# Patient Record
Sex: FEMALE | Race: WHITE | NOT HISPANIC OR LATINO | Employment: FULL TIME | URBAN - METROPOLITAN AREA
[De-identification: names, ages, dates, MRNs, and addresses within clinical notes are randomized per-mention and may not be internally consistent; named-entity substitution may affect disease eponyms.]

---

## 2019-10-07 PROBLEM — E22.1 HYPERPROLACTINEMIA: Status: ACTIVE | Noted: 2019-01-01

## 2020-03-16 ENCOUNTER — TELEPHONE (OUTPATIENT)
Dept: FAMILY MEDICINE CLINIC | Facility: CLINIC | Age: 30
End: 2020-03-16

## 2020-03-16 NOTE — TELEPHONE ENCOUNTER
PATIENT CALLED AND STATED HE WAS TESTED ON 3/13/2020 COVID 19 AND WANTED TO KNOW WHEN THE RESULTS WILL BE IN.PLEASE CALL AND ADVISE [phone]

## 2020-10-22 ENCOUNTER — TELEPHONE (OUTPATIENT)
Dept: FAMILY MEDICINE CLINIC | Facility: CLINIC | Age: 30
End: 2020-10-22

## 2020-10-22 NOTE — TELEPHONE ENCOUNTER
Radha Bright, with Dr. Haines's office is calling to state Dr. Haines reviewed the records and recommends the patient see an Endocrinologist rather than Dr. Haines  She states they are canceling the appointment with Dr. Haines.    Please advise.    Radha Bright can be reached at [phone]

## 2022-11-23 PROBLEM — R68.82 REDUCED LIBIDO: Status: ACTIVE | Noted: 2018-12-24

## 2022-11-23 PROBLEM — F32.A DEPRESSIVE DISORDER: Status: ACTIVE | Noted: 2020-11-19

## 2023-04-21 ENCOUNTER — TELEPHONE (OUTPATIENT)
Dept: FAMILY MEDICINE CLINIC | Facility: CLINIC | Age: 33
End: 2023-04-21

## 2023-04-21 RX ORDER — EMTRICITABINE AND TENOFOVIR DISOPROXIL FUMARATE 200; 300 MG/1; MG/1
1 TABLET, FILM COATED ORAL DAILY
Qty: 90 TABLET | Refills: 0 | Status: CANCELLED | OUTPATIENT
Start: 2023-04-21

## 2023-04-21 NOTE — TELEPHONE ENCOUNTER
Caller:     Relationship:     Best call back number: [phone]    Requested Prescriptions:   Requested Prescriptions     Pending Prescriptions Disp Refills   • emtricitabine-tenofovir (Truvada) 200-300 MG per tablet 90 tablet 1     Sig: Take 1 tablet by mouth Daily.        Pharmacy where request should be sent: [id number] [address] AT [facility] - [phone] [facility] [phone]      Last office visit with prescribing clinician: 3/14/2023   Last telemedicine visit with prescribing clinician: 6/13/2023   Next office visit with prescribing clinician: 6/13/2023     Additional details provided by patient: PATIENT IS OUT OF MEDICATION  Does the patient have less than a 3 day supply:  [x] Yes  [] No    Would you like a call back once the refill request has been completed: [x] Yes [] No    If the office needs to give you a call back, can they leave a voicemail: [x] Yes [] No    Francis Claros Rep   04/21/23 15:53 EDT       .”

## 2023-08-15 DIAGNOSIS — Z11.3 ROUTINE SCREENING FOR STI (SEXUALLY TRANSMITTED INFECTION): Primary | ICD-10-CM

## 2023-08-15 DIAGNOSIS — K62.89 RECTAL IRRITATION: ICD-10-CM

## 2023-08-22 DIAGNOSIS — A74.9 CHLAMYDIA: Primary | ICD-10-CM

## 2023-08-22 RX ORDER — AZITHROMYCIN 250 MG/1
1000 TABLET, FILM COATED ORAL ONCE
Qty: 4 TABLET | Refills: 0 | Status: SHIPPED | OUTPATIENT
Start: 2023-08-22 | End: 2023-08-22

## 2023-08-22 RX ORDER — DOXYCYCLINE HYCLATE 100 MG/1
100 CAPSULE ORAL 2 TIMES DAILY
Qty: 14 CAPSULE | Refills: 0 | Status: SHIPPED | OUTPATIENT
Start: 2023-08-22

## 2023-09-20 ENCOUNTER — OFFICE VISIT (OUTPATIENT)
Dept: FAMILY MEDICINE CLINIC | Facility: CLINIC | Age: 33
End: 2023-09-20
Payer: COMMERCIAL

## 2023-09-20 VITALS
OXYGEN SATURATION: 100 % | HEART RATE: 100 BPM | DIASTOLIC BLOOD PRESSURE: 74 MMHG | RESPIRATION RATE: 18 BRPM | BODY MASS INDEX: 23.43 KG/M2 | WEIGHT: 173 LBS | HEIGHT: 72 IN | SYSTOLIC BLOOD PRESSURE: 122 MMHG

## 2023-09-20 DIAGNOSIS — Z51.81 THERAPEUTIC DRUG MONITORING: ICD-10-CM

## 2023-09-20 DIAGNOSIS — A54.9 GONORRHEA: ICD-10-CM

## 2023-09-20 DIAGNOSIS — Z72.51 HIGH RISK SEXUAL BEHAVIOR, UNSPECIFIED TYPE: ICD-10-CM

## 2023-09-20 DIAGNOSIS — E34.9 HORMONE IMBALANCE: ICD-10-CM

## 2023-09-20 DIAGNOSIS — F64.0 GENDER DYSPHORIA IN ADULT: Primary | ICD-10-CM

## 2023-09-20 RX ORDER — PROGESTERONE 100 MG/1
100 CAPSULE ORAL DAILY
Qty: 90 CAPSULE | Refills: 1 | Status: SHIPPED | OUTPATIENT
Start: 2023-09-20 | End: 2024-03-18

## 2023-09-20 RX ORDER — DOXYCYCLINE HYCLATE 100 MG/1
100 CAPSULE ORAL 2 TIMES DAILY
Qty: 30 CAPSULE | Refills: 0 | Status: SHIPPED | OUTPATIENT
Start: 2023-09-20

## 2023-09-20 NOTE — PATIENT INSTRUCTIONS
For pre-exposure prophylaxis (PrEP), take 1 daily while needed. This is recommended for those engaged in frequent high risk activities and frequent exposure to gonorrhea, chlamydia, and/or syphilis.    For post-exposure prophylaxis (PEP), take 2 immediately (as soon as possible) after an episode of unprotected sex.    Both methods have been shown to decrease likelihood of infection with gonorrhea, chlamydia, and syphilis. Remember that this is not intended as a replacement for safer sex practices, but does take into account real-world practicalities.

## 2023-09-20 NOTE — PROGRESS NOTES
"Chief Complaint  Gender Dyphoria in Adult    Subjective    History of Present Illness {CC  Problem List  Visit  Diagnosis   Encounters  Notes  Medications  Labs  Result Review Imaging  Media :23}     Shankar Keene presents to [facility] for Gender Dyphoria in Adult.  History of Present Illness     Here today for follow-up as above. Doing well overall. Continues on estradiol as prescribed. Happy with current state of her transition, no unwanted changes. Due for labs today. No problems with self-administration, no injection site reactions.    Successfully treated for rectal gonorrhea. Would like test of cure today. Also interested in Doxy prep. Continues on Truvada for HIV preexposure prophylaxis as previously prescribed.    Curious about starting progesterone, hoping that it might help with libido. Does feel that she is still getting sufficient breast growth so does not want to stop that. Wondering if low-dose as needed progesterone might be sufficient for libido.    Objective     Vital Signs:   /74   Pulse 100   Resp 18   Ht 185.4 cm (73\")   Wt 78.5 kg (173 lb)   SpO2 100%   BMI 22.82 kg/m²   Physical Exam  Vitals and nursing note reviewed.   Constitutional:       General: She is not in acute distress.     Appearance: Normal appearance. She is not ill-appearing.   Cardiovascular:      Rate and Rhythm: Normal rate and regular rhythm.      Pulses: Normal pulses.      Heart sounds: Normal heart sounds. No murmur heard.  Pulmonary:      Effort: Pulmonary effort is normal. No respiratory distress.      Breath sounds: Normal breath sounds. No rales.   Neurological:      Mental Status: She is alert and oriented to person, place, and time. Mental status is at baseline.   Psychiatric:         Mood and Affect: Mood normal.         Behavior: Behavior normal.        Result Review  Data Reviewed:{ Labs  Result Review  Imaging  Med Tab  Media :23}                 "   Assessment and Plan {CC Problem List  Visit Diagnosis  ROS  Review (Popup)  Health Maintenance  Quality  BestPractice  Medications  SmartSets  SnapShot Encounters  Media :23}   Diagnoses and all orders for this visit:    1. Gender dysphoria in adult (Primary)  -     Progesterone (PROMETRIUM) 100 MG capsule; Take 1 capsule by mouth Daily for 180 days.  Dispense: 90 capsule; Refill: 1  -     Estradiol; Future  -     Estrone; Future  -     Testosterone; Future  -     Lipid Panel With LDL / HDL Ratio; Future  -     Comprehensive Metabolic Panel; Future    2. Hormone imbalance  -     Progesterone (PROMETRIUM) 100 MG capsule; Take 1 capsule by mouth Daily for 180 days.  Dispense: 90 capsule; Refill: 1  -     Estradiol; Future  -     Estrone; Future  -     Testosterone; Future  -     Lipid Panel With LDL / HDL Ratio; Future  -     Comprehensive Metabolic Panel; Future    3. Gonorrhea  -     CT / NG PCR, Rectal - Swab, Large Intestine, Rectum    4. High risk sexual behavior, unspecified type  -     doxycycline (VIBRAMYCIN) 100 MG capsule; Take 1 capsule by mouth 2 (Two) Times a Day.  Dispense: 30 capsule; Refill: 0    5. Therapeutic drug monitoring  -     Estradiol; Future  -     Estrone; Future  -     Testosterone; Future  -     Lipid Panel With LDL / HDL Ratio; Future  -     Comprehensive Metabolic Panel; Future    Orders as above. I will contact her with results as available. Discussed Doxy prep at length, resources provided in her after visit summary today.    Continue regimen as prescribed. Will let me know as refills are needed. Recommended follow-up as below. Encouraged communication via [employer] meantime.    Patient was given instructions and counseling regarding her condition or for health maintenance advice. Please see specific information pulled into the AVS (placed there by myself) if appropriate.    Return in about 3 months (around 12/20/2023), or if symptoms worsen or fail to improve, for f/u  gender-affirming hormone therapy.      SUSAN Bryant MD

## 2023-09-22 LAB
C TRACH DNA ANORECTL QL NAA+PROBE: NEGATIVE
N GONORRHOEA RRNA ANORECTL QL NAA+PROBE: NEGATIVE

## 2023-09-28 RX ORDER — TRETINOIN 0.05 G/100G
1 GEL TOPICAL NIGHTLY
Qty: 45 G | Refills: 1 | Status: SHIPPED | OUTPATIENT
Start: 2023-09-28

## 2023-10-11 DIAGNOSIS — N52.8 OTHER MALE ERECTILE DYSFUNCTION: Primary | ICD-10-CM

## 2023-10-11 RX ORDER — SILDENAFIL 25 MG/1
25 TABLET, FILM COATED ORAL DAILY PRN
Qty: 90 TABLET | Refills: 0 | Status: SHIPPED | OUTPATIENT
Start: 2023-10-11

## 2023-10-25 DIAGNOSIS — E34.9 HORMONE IMBALANCE: ICD-10-CM

## 2023-10-25 DIAGNOSIS — Z72.51 HIGH RISK SEXUAL BEHAVIOR, UNSPECIFIED TYPE: Primary | ICD-10-CM

## 2023-10-25 DIAGNOSIS — F64.0 GENDER DYSPHORIA IN ADULT: ICD-10-CM

## 2023-10-25 RX ORDER — ESTRADIOL VALERATE 20 MG/ML
INJECTION INTRAMUSCULAR
Qty: 5 ML | Refills: 3 | Status: SHIPPED | OUTPATIENT
Start: 2023-10-25

## 2023-10-25 RX ORDER — PROGESTERONE 100 MG/1
100 CAPSULE ORAL DAILY
Qty: 90 CAPSULE | Refills: 1 | Status: SHIPPED | OUTPATIENT
Start: 2023-10-25 | End: 2024-04-22

## 2023-10-25 RX ORDER — SYRINGE, DISPOSABLE, 1 ML
1 SYRINGE, EMPTY DISPOSABLE MISCELLANEOUS 2 TIMES WEEKLY
Qty: 100 EACH | Refills: 1 | Status: SHIPPED | OUTPATIENT
Start: 2023-10-26

## 2023-10-25 RX ORDER — LANCETS 30 GAUGE
1 EACH MISCELLANEOUS 2 TIMES WEEKLY
Qty: 100 EACH | Refills: 1 | Status: SHIPPED | OUTPATIENT
Start: 2023-10-26

## 2023-10-25 RX ORDER — EMTRICITABINE AND TENOFOVIR DISOPROXIL FUMARATE 200; 300 MG/1; MG/1
1 TABLET, FILM COATED ORAL DAILY
Qty: 90 TABLET | Refills: 1 | Status: SHIPPED | OUTPATIENT
Start: 2023-10-25

## 2023-11-09 RX ORDER — TRETINOIN 1 MG/G
GEL TOPICAL NIGHTLY
Qty: 60 G | Refills: 1 | Status: SHIPPED | OUTPATIENT
Start: 2023-11-09 | End: 2023-11-12

## 2023-11-10 DIAGNOSIS — Z72.51 HIGH RISK SEXUAL BEHAVIOR, UNSPECIFIED TYPE: ICD-10-CM

## 2023-11-10 RX ORDER — DOXYCYCLINE HYCLATE 100 MG/1
100 CAPSULE ORAL 2 TIMES DAILY
Qty: 30 CAPSULE | Refills: 0 | Status: SHIPPED | OUTPATIENT
Start: 2023-11-10

## 2023-11-12 RX ORDER — TRETINOIN 0.4 MG/G
GEL TOPICAL NIGHTLY
Qty: 50 G | Refills: 1 | Status: SHIPPED | OUTPATIENT
Start: 2023-11-12

## 2023-11-21 DIAGNOSIS — K62.89 RECTAL IRRITATION: Primary | ICD-10-CM

## 2023-11-28 ENCOUNTER — OFFICE VISIT (OUTPATIENT)
Dept: FAMILY MEDICINE CLINIC | Facility: CLINIC | Age: 33
End: 2023-11-28
Payer: COMMERCIAL

## 2023-11-28 VITALS
DIASTOLIC BLOOD PRESSURE: 82 MMHG | OXYGEN SATURATION: 98 % | HEART RATE: 91 BPM | WEIGHT: 180 LBS | HEIGHT: 72 IN | RESPIRATION RATE: 18 BRPM | SYSTOLIC BLOOD PRESSURE: 118 MMHG | BODY MASS INDEX: 24.38 KG/M2

## 2023-11-28 DIAGNOSIS — N48.89: ICD-10-CM

## 2023-11-28 DIAGNOSIS — R19.8 ANAL DISCHARGE: Primary | ICD-10-CM

## 2023-11-28 RX ORDER — TESTOSTERONE 16.2 MG/G
2.5 GEL TRANSDERMAL DAILY
Qty: 75 EACH | Refills: 5 | Status: SHIPPED | OUTPATIENT
Start: 2023-11-28 | End: 2023-11-29

## 2023-11-28 NOTE — PROGRESS NOTES
"Chief Complaint  Penis Pain (X 8 weeks )    Subjective    History of Present Illness {CC  Problem List  Visit  Diagnosis   Encounters  Notes  Medications  Labs  Result Review Imaging  Media :23}     Shankar Keene presents to [facility] for Penis Pain (X 8 weeks ).  History of Present Illness     Here today with concern as above. Has had irritation around the head of her penis for weeks now. Has been treating with antibiotic ointment and Vaseline without much improvement. Has been very concerned about the possibility of a sexually transmitted infection in the past however she has been tested and treated several times. Does have some ongoing odor in the anal region, is worried that she has contracted gonorrhea again. Did have negative testing recently though symptoms continue. Is monogamous with 1 male partner at this time.    Has done some reading, is wondering about the possibility of using very small amount of topical testosterone on the penis to avoid further atrophy with the gender affirming hormone therapy that she has been using. Also interested in seeing urology for further evaluation and management.    Objective     Vital Signs:   /82   Pulse 91   Resp 18   Ht 185.4 cm (73\")   Wt 81.6 kg (180 lb)   SpO2 98%   BMI 23.75 kg/m²   Physical Exam  Vitals and nursing note reviewed.   Constitutional:       General: She is not in acute distress.     Appearance: Normal appearance. She is not ill-appearing.   Cardiovascular:      Rate and Rhythm: Normal rate and regular rhythm.      Pulses: Normal pulses.      Heart sounds: Normal heart sounds. No murmur heard.  Pulmonary:      Effort: Pulmonary effort is normal. No respiratory distress.      Breath sounds: Normal breath sounds. No rales.   Genitourinary:     Comments: Irritation and maceration around the dorsal coronal ridge. No erythema or swelling suggestive of active infection.  Neurological:      Mental " Status: She is alert and oriented to person, place, and time. Mental status is at baseline.   Psychiatric:         Mood and Affect: Mood normal.         Behavior: Behavior normal.          Result Review  Data Reviewed:{ Labs  Result Review  Imaging  Med Tab  Media :23}                   Assessment and Plan {CC Problem List  Visit Diagnosis  ROS  Review (Popup)  Health Maintenance  Quality  BestPractice  Medications  SmartSets  SnapShot Encounters  Media :23}   Diagnoses and all orders for this visit:    1. Anal discharge (Primary)  -     Chlamydia trachomatis, Neisseria gonorrhoeae, Trichomonas vaginalis, PCR - Swab, Urethra  -     Cancel: CT / NG PCR, Rectal - Swab, Large Intestine, Rectum; Future  -     CT / NG PCR, Rectal - Swab, Large Intestine, Rectum    2. Penile atrophy  -     Testosterone (AndroGel Pump) 20.25 MG/ACT (1.62%) gel; Place 2.5 g on the skin as directed by provider Daily.  Dispense: 75 each; Refill: 5  -     Ambulatory Referral to Urology    Will go ahead and test as above for ongoing anal irritation and discharge. Suspect possible trichomonas. We can treat presumptively if needed. She will keep me updated with any change in symptoms.    Will try low-dose testosterone as above and will refer to urology for further evaluation and management.    Recommended follow up as below. Encouraged communication via [employer] in the meantime.     Patient was given instructions and counseling regarding her condition or for health maintenance advice. Please see specific information pulled into the AVS (placed there by myself) if appropriate.    Return in about 2 months (around 1/28/2024), or if symptoms worsen or fail to improve.    SUSAN Bryant MD

## 2023-11-29 DIAGNOSIS — N48.89: Primary | ICD-10-CM

## 2023-11-29 LAB
C TRACH RRNA SPEC QL NAA+PROBE: NEGATIVE
N GONORRHOEA RRNA SPEC QL NAA+PROBE: NEGATIVE
T VAGINALIS RRNA SPEC QL NAA+PROBE: NEGATIVE

## 2023-11-29 RX ORDER — TESTOSTERONE 12.5 MG/1.25G
25 GEL TOPICAL DAILY
Qty: 10 EACH | Refills: 5 | Status: SHIPPED | OUTPATIENT
Start: 2023-11-29

## 2023-11-29 RX ORDER — METRONIDAZOLE 500 MG/1
500 TABLET ORAL 2 TIMES DAILY
Qty: 28 TABLET | Refills: 0 | Status: SHIPPED | OUTPATIENT
Start: 2023-11-29 | End: 2023-12-01 | Stop reason: SDUPTHER

## 2023-12-01 ENCOUNTER — TELEPHONE (OUTPATIENT)
Dept: FAMILY MEDICINE CLINIC | Facility: CLINIC | Age: 33
End: 2023-12-01

## 2023-12-01 RX ORDER — METRONIDAZOLE 500 MG/1
500 TABLET ORAL 2 TIMES DAILY
Qty: 28 TABLET | Refills: 0 | Status: SHIPPED | OUTPATIENT
Start: 2023-12-01 | End: 2023-12-15

## 2023-12-01 NOTE — TELEPHONE ENCOUNTER
"    Caller: Shankar Keene \"Armand\"    Relationship to patient: Self    Best call back number: [phone]    Patient is needing:     PATIENT ACCIDENTALLY THREW AWAY HALF OF THE PRESCRIPTION THAT WAS SENT IN. AND IS NEEDING A NEW PRESCRIPTION FOR 7 DAYS.         metroNIDAZOLE (Flagyl) 500 MG tablet         PREFERRED PHARMACY:     [id number] [place], [place] - [address] AT [facility] [address] [facility] [phone] [facility] [phone]  [phone]   "

## 2023-12-05 DIAGNOSIS — J30.2 SEASONAL ALLERGIES: ICD-10-CM

## 2023-12-06 RX ORDER — ALBUTEROL SULFATE 90 UG/1
2 AEROSOL, METERED RESPIRATORY (INHALATION) EVERY 4 HOURS PRN
Qty: 8 G | Refills: 6 | Status: SHIPPED | OUTPATIENT
Start: 2023-12-06

## 2023-12-15 DIAGNOSIS — N52.8 OTHER MALE ERECTILE DYSFUNCTION: ICD-10-CM

## 2023-12-15 RX ORDER — SILDENAFIL CITRATE 20 MG/1
20 TABLET ORAL DAILY PRN
Qty: 90 TABLET | Refills: 2 | Status: SHIPPED | OUTPATIENT
Start: 2023-12-15

## 2023-12-18 NOTE — PROGRESS NOTES
"Consult (Gender dysphoria; facial feminization)            History of Present Illness  Shankar Keene is a 33 y.o. adult who presents to [facility] as a transgender female, a consult from Preston Bryant MD to discuss facial feminization surgery.  She has identified herself as female  and started social transitioning 1+ years ago.   She has been medically transitioning on for about 1+_year. She does not have a mental health provider letter on file from a from a mental health provider that supports the need for facial feminization surgery. She is otherwise reasonably healthy and does not smoke. She has not changed her social name.  She would like to get my opinion about the feminine features on her face and what could be change. Her main complaint is her nose and prominent  Anupam's apple.    Has had voice therapy.     ROS:   All review of system is negative except the ones above.     Subjective       Red dye, Penicillins, and Sulfa antibiotics  Allergies Reconciled.  Review of Systems  All system were reviewed and were negative, except the ones noted above.   Objective     /87 (BP Location: Left arm, Patient Position: Sitting, Cuff Size: Adult)   Pulse 102   Temp 98.6 °F (37 °C) (Temporal)   Ht 185.4 cm (72.99\")   Wt 84.7 kg (186 lb 12.8 oz)   SpO2 98%   BMI 24.65 kg/m²     Body mass index is 24.65 kg/m².    Physical Exam   Cardiovascular: Normal rate.     Pulmonary/Chest  Effort normal.       Face: patient has a normocephalic head with a long forehead with 7 cm between the eyebrows to the start of the hairline. There is male badness pattern. There are several forehead bossing with rounded prominences of the bone from the mid forehead outward to the outside of the bony eye sockets. Brows are located at the level of the upper orbital rim   Temporal fossa with adequate fill  Cheek: good soft tissue fill   Nose is straight, no septal deviation, nasal " radix with an acute angle of 100, nose is long with deformity at the keystone area.   Jaw  is very feminine, mainly in the angles   but the  para symphyseal angle is larger  Neck: supple with no trachea deviation, obvious Anupam's apple.   Result Review :       Procedures         Assessment and Plan      Diagnoses and all orders for this visit:    1. Gender dysphoria (Primary)  -     CT Maxillofacial Without Contrast; Future    2. Gender dysphoria in adult        Plan: Facial Feminization with frontal sinus set back, orbital rim remodeling, rhinoplasty, and tracheal shaving 6.5hrs - she will think about the genioplasty. I will add that for the consent and OR request and we can de escalate if she is not comfortable with that procedure at this time.   The patient  would be a reasonable candidate for facial feminization  surgery in that she has been transitioning for some time and has a masculinized forehead, nose and neck. Changing her masculine features to reflect the smaller angles and proportions of a female face will generate many positive social and psychological  benefits. She was told about the nature of the procedure and the fact that it would involve an incision in the of the hairline, shaving of the bone with possible frontal sinus fracture and repositioning to achieve the convex forehead shape.  The jaw would be addressed through an intra oral incision to address the symphysis and para symphysis and a submental incision for the correction of the Anupam's apple.  She was told about potential complications including bleeding, infection, anesthetic risks, wound healing difficulties, contour irregularities, scalp numbness  and asymmetries.  She was interested in proceeding with surgery. She was told to we would require a letter from a mental health professionals supporting her decision to proceed with this surgery. I will attempt to get insurance coverage for such a procedure for her. Photos were obtained.  I spent  60 minutes with patient explaining about the specific clinical condition and care.   ICD:  F64.0 gender dysphoria   CPT:    09545 reduction forehead, contouring and setback of anterior frontal sinus wall  44789 adjacent tissue transfer for scalp advancement  47293 genioplasty with osteotomies, 2 or more osteotomies  47436 chin and mandibular  bone reduction via high speed burring and soft tissue reshaping  39202 rhinoplasty, primary, complete, external parts, including bony pyramid, lateral and alar cartilages, and/por elevation of the nasal tip   26426-45  turbinectomy         74143 tracheoplasty, cervical   0559T anatomic model 3D   0561T anatomic guide from ct     98343- Smoking and tobacco use cessation counseling visit; intensive, greater than 10 minutes    Scribed by Alanna Hope, acting as a scribe for Hugh Shanks MD, 12/28/23 13:32 EST.  Hugh Shanks MD's signature on the note affirms that the note adequately documents the care provided.               Follow Up     Return RTC in 2 months.    Patient was given instructions and counseling regarding her condition. Please see specific information pulled into the AVS if appropriate.     Hugh Shanks MD  12/28/2023

## 2023-12-21 ENCOUNTER — OFFICE VISIT (OUTPATIENT)
Dept: FAMILY MEDICINE CLINIC | Facility: CLINIC | Age: 33
End: 2023-12-21
Payer: COMMERCIAL

## 2023-12-21 VITALS
DIASTOLIC BLOOD PRESSURE: 80 MMHG | SYSTOLIC BLOOD PRESSURE: 126 MMHG | RESPIRATION RATE: 18 BRPM | HEIGHT: 72 IN | HEART RATE: 115 BPM | OXYGEN SATURATION: 99 % | WEIGHT: 185 LBS | BODY MASS INDEX: 25.06 KG/M2

## 2023-12-21 DIAGNOSIS — F64.0 GENDER DYSPHORIA IN ADULT: Primary | ICD-10-CM

## 2023-12-21 DIAGNOSIS — Z51.81 THERAPEUTIC DRUG MONITORING: ICD-10-CM

## 2023-12-21 DIAGNOSIS — E34.9 HORMONE IMBALANCE: ICD-10-CM

## 2023-12-21 NOTE — PROGRESS NOTES
"Chief Complaint  Gender Dysphoria in Adult (3 month f/u)    Subjective    History of Present Illness {CC  Problem List  Visit  Diagnosis   Encounters  Notes  Medications  Labs  Result Review Imaging  Media :23}     Shankar Keene presents to [facility] for Gender Dysphoria in Adult (3 month f/u).  History of Present Illness     Here today for follow-up as above. Due for check of hormone labs today. Continues with regimen as prescribed. No problems with self administration, no injection site reactions. Very happy with the current state of her transition. Has an appointment with Dr. Shanks upcoming to discuss possible facial feminization.    Has been using some topical testosterone to help with some penile skin atrophy and low libido. Is happy to report that results have been better than expected. Is interested in knowing how much systemic absorption she is getting, would like to check another testosterone level immediately after dosing in the future.    Objective     Vital Signs:   /80   Pulse 115   Resp 18   Ht 185.4 cm (73\")   Wt 83.9 kg (185 lb)   SpO2 99%   BMI 24.41 kg/m²   Physical Exam  Vitals and nursing note reviewed.   Constitutional:       General: She is not in acute distress.     Appearance: Normal appearance. She is not ill-appearing.   Cardiovascular:      Rate and Rhythm: Normal rate and regular rhythm.      Pulses: Normal pulses.      Heart sounds: Normal heart sounds. No murmur heard.  Pulmonary:      Effort: Pulmonary effort is normal. No respiratory distress.      Breath sounds: Normal breath sounds. No rales.   Neurological:      Mental Status: She is alert and oriented to person, place, and time. Mental status is at baseline.   Psychiatric:         Mood and Affect: Mood normal.         Behavior: Behavior normal.          Result Review  Data Reviewed:{ Labs  Result Review  Imaging  Med Tab  Media :23}                   Assessment and " Plan {CC Problem List  Visit Diagnosis  ROS  Review (Popup)  Health Maintenance  Quality  BestPractice  Medications  SmartSets  SnapShot Encounters  Media :23}   Diagnoses and all orders for this visit:    1. Gender dysphoria in adult (Primary)  -     Estradiol  -     Estrone  -     Testosterone  -     Testosterone; Future    2. Hormone imbalance  -     Estradiol  -     Estrone  -     Testosterone  -     Testosterone; Future    3. Therapeutic drug monitoring  -     Estradiol  -     Estrone  -     Testosterone  -     Testosterone; Future    Orders as above. I will contact her with results as available. Also ordered a repeat testosterone to be done in the future after low-dose testosterone administration.    Recommended follow up as below. Encouraged communication via [employer] in the meantime.     Patient was given instructions and counseling regarding her condition or for health maintenance advice. Please see specific information pulled into the AVS (placed there by myself) if appropriate.    Return in about 3 months (around 3/21/2024), or if symptoms worsen or fail to improve, for f/u gender-affirming hormone therapy.    SUSAN Bryant MD

## 2023-12-22 LAB
ESTRADIOL SERPL-MCNC: 161 PG/ML
ESTRONE SERPL-MCNC: 100 PG/ML (ref 27–231)
TESTOST SERPL-MCNC: 14 NG/DL (ref 8–60)

## 2023-12-28 ENCOUNTER — OFFICE VISIT (OUTPATIENT)
Dept: PLASTIC SURGERY | Facility: CLINIC | Age: 33
End: 2023-12-28
Payer: COMMERCIAL

## 2023-12-28 VITALS
OXYGEN SATURATION: 98 % | TEMPERATURE: 98.6 F | WEIGHT: 186.8 LBS | HEIGHT: 72 IN | HEART RATE: 102 BPM | SYSTOLIC BLOOD PRESSURE: 126 MMHG | BODY MASS INDEX: 25.3 KG/M2 | DIASTOLIC BLOOD PRESSURE: 87 MMHG

## 2023-12-28 DIAGNOSIS — F64.9 GENDER DYSPHORIA: Primary | ICD-10-CM

## 2023-12-28 DIAGNOSIS — F64.0 GENDER DYSPHORIA IN ADULT: ICD-10-CM

## 2023-12-28 PROCEDURE — 99205 OFFICE O/P NEW HI 60 MIN: CPT | Performed by: SURGERY

## 2023-12-28 RX ORDER — DEXTROAMPHETAMINE SACCHARATE, AMPHETAMINE ASPARTATE, DEXTROAMPHETAMINE SULFATE AND AMPHETAMINE SULFATE 3.75; 3.75; 3.75; 3.75 MG/1; MG/1; MG/1; MG/1
TABLET ORAL
COMMUNITY
Start: 2023-12-21

## 2024-01-02 ENCOUNTER — PATIENT MESSAGE (OUTPATIENT)
Dept: PLASTIC SURGERY | Facility: CLINIC | Age: 34
End: 2024-01-02
Payer: COMMERCIAL

## 2024-01-02 ENCOUNTER — PREP FOR SURGERY (OUTPATIENT)
Dept: OTHER | Facility: HOSPITAL | Age: 34
End: 2024-01-02
Payer: COMMERCIAL

## 2024-01-02 ENCOUNTER — PATIENT ROUNDING (BHMG ONLY) (OUTPATIENT)
Dept: PLASTIC SURGERY | Facility: CLINIC | Age: 34
End: 2024-01-02
Payer: COMMERCIAL

## 2024-01-02 DIAGNOSIS — F64.0 GENDER DYSPHORIA IN ADULT: Primary | ICD-10-CM

## 2024-01-02 PROBLEM — C50.919 BREAST CANCER: Status: ACTIVE | Noted: 2024-01-02

## 2024-01-02 RX ORDER — SCOLOPAMINE TRANSDERMAL SYSTEM 1 MG/1
1 PATCH, EXTENDED RELEASE TRANSDERMAL CONTINUOUS
OUTPATIENT
Start: 2024-01-02 | End: 2024-01-05

## 2024-01-02 RX ORDER — CEFAZOLIN SODIUM IN 0.9 % NACL 3 G/100 ML
3000 INTRAVENOUS SOLUTION, PIGGYBACK (ML) INTRAVENOUS ONCE
OUTPATIENT
Start: 2024-01-02 | End: 2024-01-02

## 2024-01-11 ENCOUNTER — TELEPHONE (OUTPATIENT)
Dept: PLASTIC SURGERY | Facility: CLINIC | Age: 34
End: 2024-01-11
Payer: COMMERCIAL

## 2024-01-12 ENCOUNTER — TELEPHONE (OUTPATIENT)
Dept: PLASTIC SURGERY | Facility: CLINIC | Age: 34
End: 2024-01-12
Payer: COMMERCIAL

## 2024-01-16 ENCOUNTER — TELEPHONE (OUTPATIENT)
Dept: PLASTIC SURGERY | Facility: CLINIC | Age: 34
End: 2024-01-16
Payer: COMMERCIAL

## 2024-01-22 RX ORDER — POLYMYXIN B SULFATE AND TRIMETHOPRIM 1; 10000 MG/ML; [USP'U]/ML
1 SOLUTION OPHTHALMIC EVERY 4 HOURS
Qty: 10 ML | Refills: 0 | Status: SHIPPED | OUTPATIENT
Start: 2024-01-22 | End: 2024-01-22 | Stop reason: SDUPTHER

## 2024-01-22 RX ORDER — POLYMYXIN B SULFATE AND TRIMETHOPRIM 1; 10000 MG/ML; [USP'U]/ML
1 SOLUTION OPHTHALMIC EVERY 4 HOURS
Qty: 10 ML | Refills: 0 | Status: SHIPPED | OUTPATIENT
Start: 2024-01-22

## 2024-01-24 DIAGNOSIS — Z72.51 HIGH RISK SEXUAL BEHAVIOR, UNSPECIFIED TYPE: ICD-10-CM

## 2024-01-24 RX ORDER — DOXYCYCLINE HYCLATE 100 MG/1
100 CAPSULE ORAL 2 TIMES DAILY
Qty: 30 CAPSULE | Refills: 0 | Status: SHIPPED | OUTPATIENT
Start: 2024-01-24

## 2024-02-06 ENCOUNTER — OFFICE VISIT (OUTPATIENT)
Dept: FAMILY MEDICINE CLINIC | Facility: CLINIC | Age: 34
End: 2024-02-06
Payer: COMMERCIAL

## 2024-02-06 VITALS
BODY MASS INDEX: 24.52 KG/M2 | SYSTOLIC BLOOD PRESSURE: 128 MMHG | DIASTOLIC BLOOD PRESSURE: 88 MMHG | RESPIRATION RATE: 18 BRPM | WEIGHT: 181 LBS | HEART RATE: 103 BPM | HEIGHT: 72 IN | OXYGEN SATURATION: 100 %

## 2024-02-06 DIAGNOSIS — H69.93 EUSTACHIAN TUBE DYSFUNCTION, BILATERAL: ICD-10-CM

## 2024-02-06 DIAGNOSIS — F64.0 GENDER DYSPHORIA IN ADULT: ICD-10-CM

## 2024-02-06 DIAGNOSIS — J06.9 VIRAL URI: Primary | ICD-10-CM

## 2024-02-06 DIAGNOSIS — E34.9 HORMONE IMBALANCE: ICD-10-CM

## 2024-02-06 PROCEDURE — 99213 OFFICE O/P EST LOW 20 MIN: CPT | Performed by: FAMILY MEDICINE

## 2024-02-06 RX ORDER — PROGESTERONE 50 MG/ML
10 INJECTION, SOLUTION INTRAMUSCULAR WEEKLY
Qty: 10 ML | Refills: 1 | Status: SHIPPED | OUTPATIENT
Start: 2024-02-06

## 2024-02-06 NOTE — PROGRESS NOTES
"Chief Complaint  Thrush and Sore Throat    Subjective    History of Present Illness    Shankar Keene presents to [facility] for Thrush and Sore Throat.  History of Present Illness     Here today with concern as above. Has had upper respiratory symptoms for better part of a month now. Worried that there might be something going on more so than a simple upper respiratory virus. Has taken home COVID tests that have been negative. No further fevers or chills. Main concerns at this point are ongoing congestion and some slight sore throat. Was previously worried about the possibility of thrush. No further tongue changes noted.    Interested in switching to injectable progesterone, hoping that this might provide more overall benefit. Has found that penile health seems better with increased doses of progesterone. Has been able to avoid topical testosterone without any return of penile atrophy.    Objective     Vital Signs:   /88   Pulse 103   Resp 18   Ht 182.9 cm (72\")   Wt 82.1 kg (181 lb)   SpO2 100%   BMI 24.55 kg/m²   Physical Exam  Vitals and nursing note reviewed.   Constitutional:       General: She is not in acute distress.     Appearance: Normal appearance. She is not ill-appearing.   HENT:      Right Ear: Hearing, ear canal and external ear normal.      Left Ear: Hearing, ear canal and external ear normal.      Ears:      Comments: Slight mucoid effusion bilateral     Nose: Mucosal edema and congestion present.      Mouth/Throat:      Pharynx: Posterior oropharyngeal erythema present.      Comments: Posterior pharyngeal cobblestoning  Cardiovascular:      Rate and Rhythm: Normal rate and regular rhythm.      Pulses: Normal pulses.      Heart sounds: Normal heart sounds. No murmur heard.  Pulmonary:      Effort: Pulmonary effort is normal. No respiratory distress.      Breath sounds: Normal breath sounds. No rales.   Neurological:      Mental Status: She is alert and " oriented to person, place, and time. Mental status is at baseline.   Psychiatric:         Mood and Affect: Mood normal.         Behavior: Behavior normal.                          Assessment and Plan   Diagnoses and all orders for this visit:    1. Viral URI (Primary)    2. Eustachian tube dysfunction, bilateral    3. Gender dysphoria in adult  -     progesterone oil 50 MG/ML injection; Inject 0.2 mL into the appropriate muscle as directed by prescriber 1 (One) Time Per Week.  Dispense: 10 mL; Refill: 1    4. Hormone imbalance  -     progesterone oil 50 MG/ML injection; Inject 0.2 mL into the appropriate muscle as directed by prescriber 1 (One) Time Per Week.  Dispense: 10 mL; Refill: 1    Recommended ongoing supportive care and symptom management for viral URI and eustachian tube dysfunction. Suggested over-the-counter Afrin nasal spray for 3 to 5 days followed by a nasal steroid.    Injectable progesterone as above. Will plan on checking levels in about 3 months.  BMI is within normal parameters. No other follow-up for BMI required.    Recommended follow up as below. Encouraged communication via [employer] in the meantime.     Patient was given instructions and counseling regarding her condition or for health maintenance advice. Please see specific information pulled into the AVS (placed there by myself) if appropriate.    Return in about 3 months (around 5/6/2024), or if symptoms worsen or fail to improve, for f/u gender-affirming hormone therapy.    SUSAN Bryant MD

## 2024-02-16 ENCOUNTER — TELEPHONE (OUTPATIENT)
Dept: PLASTIC SURGERY | Facility: CLINIC | Age: 34
End: 2024-02-16
Payer: COMMERCIAL

## 2024-02-20 RX ORDER — PREDNISONE 20 MG/1
40 TABLET ORAL DAILY
Qty: 10 TABLET | Refills: 0 | Status: SHIPPED | OUTPATIENT
Start: 2024-02-20 | End: 2024-02-25

## 2024-02-26 ENCOUNTER — TELEPHONE (OUTPATIENT)
Dept: PLASTIC SURGERY | Facility: CLINIC | Age: 34
End: 2024-02-26
Payer: COMMERCIAL

## 2024-02-26 NOTE — TELEPHONE ENCOUNTER
Contacted patient regarding pending CT scan with follow up.  Patient states that they have been working out of town and unable to get the CT scan and would like to cancel upcoming appointment at this time.

## 2024-03-15 DIAGNOSIS — E34.9 HORMONE IMBALANCE: ICD-10-CM

## 2024-03-15 DIAGNOSIS — F64.0 GENDER DYSPHORIA IN ADULT: ICD-10-CM

## 2024-03-15 RX ORDER — ESTRADIOL VALERATE 20 MG/ML
INJECTION INTRAMUSCULAR
Qty: 5 ML | Refills: 3 | Status: SHIPPED | OUTPATIENT
Start: 2024-03-15

## 2024-03-21 ENCOUNTER — OFFICE VISIT (OUTPATIENT)
Dept: FAMILY MEDICINE CLINIC | Facility: CLINIC | Age: 34
End: 2024-03-21
Payer: COMMERCIAL

## 2024-03-21 VITALS
RESPIRATION RATE: 18 BRPM | OXYGEN SATURATION: 99 % | SYSTOLIC BLOOD PRESSURE: 122 MMHG | HEIGHT: 72 IN | BODY MASS INDEX: 24.92 KG/M2 | WEIGHT: 184 LBS | HEART RATE: 101 BPM | DIASTOLIC BLOOD PRESSURE: 76 MMHG

## 2024-03-21 DIAGNOSIS — H69.93 EUSTACHIAN TUBE DYSFUNCTION, BILATERAL: ICD-10-CM

## 2024-03-21 DIAGNOSIS — E34.9 HORMONE IMBALANCE: ICD-10-CM

## 2024-03-21 DIAGNOSIS — F64.0 GENDER DYSPHORIA IN ADULT: ICD-10-CM

## 2024-03-21 DIAGNOSIS — Z51.81 THERAPEUTIC DRUG MONITORING: ICD-10-CM

## 2024-03-21 DIAGNOSIS — J02.9 SORE THROAT: Primary | ICD-10-CM

## 2024-03-21 DIAGNOSIS — Z11.3 ROUTINE SCREENING FOR STI (SEXUALLY TRANSMITTED INFECTION): ICD-10-CM

## 2024-03-21 NOTE — PROGRESS NOTES
"Chief Complaint  Gender Dysphoria in Adult (3 month f/u), Sore Throat (\"Swelling sensation\" since January 4th ), and Tinnitus (X3 weeks )    Subjective    History of Present Illness    Shankar Keene presents to [facility] for Gender Dysphoria in Adult (3 month f/u), Sore Throat (\"Swelling sensation\" since January 4th ), and Tinnitus (X3 weeks ).  History of Present Illness     Here today with concerns as above. Has had some ongoing intermittent sore throat, ear fullness, and tinnitus for the past several weeks to months. Has had some intermittent upper respiratory symptoms as well, thinks that she may have gotten several viruses. No other upper respiratory symptoms present at this time. Stress has remained quite high and she has a fair amount of tension in her shoulders and neck. Is worried that she has some ongoing lymphadenopathy under her left jaw.    Due for STI screening, is not sure if the above symptoms could be suggestive of further sexually-transmitted infections. Also has some ongoing rectal fishy smell. Has had some new partners since her last screen.    Due for check of hormone labs today. Is curious about the possibility of having an elevated DHT level with the progesterone. Has switched back to twice weekly progesterone injections and feels that it helps quite a bit with mood and sleep.    Objective     Vital Signs:   /76   Pulse 101   Resp 18   Ht 182.9 cm (72\")   Wt 83.5 kg (184 lb)   SpO2 99%   BMI 24.95 kg/m²   Physical Exam  Vitals and nursing note reviewed.   Constitutional:       General: She is not in acute distress.     Appearance: Normal appearance. She is not ill-appearing.   HENT:      Ears:      Comments: Moderate mucoid effusion bilateral  Cardiovascular:      Rate and Rhythm: Normal rate and regular rhythm.      Pulses: Normal pulses.      Heart sounds: Normal heart sounds. No murmur heard.  Pulmonary:      Effort: Pulmonary effort is normal. " No respiratory distress.      Breath sounds: Normal breath sounds. No rales.   Neurological:      Mental Status: She is alert and oriented to person, place, and time. Mental status is at baseline.   Psychiatric:         Mood and Affect: Mood normal.         Behavior: Behavior normal.                        Assessment and Plan   Diagnoses and all orders for this visit:    1. Sore throat (Primary)  -     Ct / GC DEANNA, Pharyngeal - Swab, Throat  -     CBC & Differential    2. Routine screening for STI (sexually transmitted infection)  -     Ct / GC DEANNA, Pharyngeal - Swab, Throat  -     CT / NG PCR, Rectal - Swab, Large Intestine, Rectum  -     Chlamydia trachomatis, Neisseria gonorrhoeae, PCR - Urine, Urine, Clean Catch  -     RPR  -     HIV-1 / O / 2 Ag / Antibody    3. Gender dysphoria in adult  -     Estradiol  -     Estrone  -     Testosterone  -     Comprehensive Metabolic Panel  -     Progesterone  -     Lipid Panel  -     Dihydrotestosterone    4. Hormone imbalance  -     Estradiol  -     Estrone  -     Testosterone  -     Comprehensive Metabolic Panel  -     Progesterone  -     Lipid Panel  -     Dihydrotestosterone    5. Therapeutic drug monitoring  -     Estradiol  -     Estrone  -     Testosterone  -     Comprehensive Metabolic Panel  -     Progesterone  -     Lipid Panel  -     Dihydrotestosterone    6. Eustachian tube dysfunction, bilateral    Definitely has some eustachian tube dysfunction bilateral, not sure if that contributes to the throat pain. Think it is worth doing STI screening as above given increased risks of late. May also be some generalized allergic rhinitis. Recommended some over-the-counter remedies for that.    Labs as above, I will contact her with results as available.    Recommended follow up as below. Encouraged communication via [employer] in the meantime.     Patient was given instructions and counseling regarding her condition or for health maintenance advice. Please see specific  information pulled into the AVS (placed there by myself) if appropriate.    Return in about 3 months (around 6/21/2024), or if symptoms worsen or fail to improve, for f/u gender-affirming hormone therapy.    SUSAN Bryant MD

## 2024-03-22 LAB
ALBUMIN SERPL-MCNC: 4.6 G/DL (ref 3.9–4.9)
ALBUMIN/GLOB SERPL: 1.8 {RATIO} (ref 1.2–2.2)
ALP SERPL-CCNC: 63 IU/L (ref 44–121)
ALT SERPL-CCNC: 22 IU/L (ref 0–32)
AST SERPL-CCNC: 18 IU/L (ref 0–40)
BASOPHILS # BLD AUTO: 0.1 X10E3/UL (ref 0–0.2)
BASOPHILS NFR BLD AUTO: 1 %
BILIRUB SERPL-MCNC: 0.5 MG/DL (ref 0–1.2)
BUN SERPL-MCNC: 13 MG/DL (ref 6–20)
BUN/CREAT SERPL: 15 (ref 9–23)
CALCIUM SERPL-MCNC: 9.3 MG/DL (ref 8.7–10.2)
CHLORIDE SERPL-SCNC: 100 MMOL/L (ref 96–106)
CHOLEST SERPL-MCNC: 223 MG/DL (ref 100–199)
CO2 SERPL-SCNC: 22 MMOL/L (ref 20–29)
CREAT SERPL-MCNC: 0.88 MG/DL (ref 0.57–1)
EGFRCR SERPLBLD CKD-EPI 2021: 88 ML/MIN/1.73
EOSINOPHIL # BLD AUTO: 0.2 X10E3/UL (ref 0–0.4)
EOSINOPHIL NFR BLD AUTO: 2 %
ERYTHROCYTE [DISTWIDTH] IN BLOOD BY AUTOMATED COUNT: 11.9 % (ref 11.7–15.4)
ESTRADIOL SERPL-MCNC: 175 PG/ML
GLOBULIN SER CALC-MCNC: 2.5 G/DL (ref 1.5–4.5)
GLUCOSE SERPL-MCNC: 97 MG/DL (ref 70–99)
HCT VFR BLD AUTO: 41.2 % (ref 34–46.6)
HDLC SERPL-MCNC: 67 MG/DL
HGB BLD-MCNC: 14 G/DL (ref 11.1–15.9)
HIV 1+2 AB+HIV1 P24 AG SERPL QL IA: NON REACTIVE
IMM GRANULOCYTES # BLD AUTO: 0 X10E3/UL (ref 0–0.1)
IMM GRANULOCYTES NFR BLD AUTO: 0 %
LDLC SERPL CALC-MCNC: 136 MG/DL (ref 0–99)
LYMPHOCYTES # BLD AUTO: 2.2 X10E3/UL (ref 0.7–3.1)
LYMPHOCYTES NFR BLD AUTO: 25 %
MCH RBC QN AUTO: 31.5 PG (ref 26.6–33)
MCHC RBC AUTO-ENTMCNC: 34 G/DL (ref 31.5–35.7)
MCV RBC AUTO: 93 FL (ref 79–97)
MONOCYTES # BLD AUTO: 0.8 X10E3/UL (ref 0.1–0.9)
MONOCYTES NFR BLD AUTO: 10 %
NEUTROPHILS # BLD AUTO: 5.3 X10E3/UL (ref 1.4–7)
NEUTROPHILS NFR BLD AUTO: 62 %
PLATELET # BLD AUTO: 203 X10E3/UL (ref 150–450)
POTASSIUM SERPL-SCNC: 4.2 MMOL/L (ref 3.5–5.2)
PROGEST SERPL-MCNC: 0.2 NG/ML
PROT SERPL-MCNC: 7.1 G/DL (ref 6–8.5)
RBC # BLD AUTO: 4.45 X10E6/UL (ref 3.77–5.28)
RPR SER QL: NON REACTIVE
SODIUM SERPL-SCNC: 137 MMOL/L (ref 134–144)
TESTOST SERPL-MCNC: 13 NG/DL (ref 8–60)
TRIGL SERPL-MCNC: 111 MG/DL (ref 0–149)
VLDLC SERPL CALC-MCNC: 20 MG/DL (ref 5–40)
WBC # BLD AUTO: 8.6 X10E3/UL (ref 3.4–10.8)

## 2024-03-23 LAB
C TRACH RRNA NPH QL NAA+PROBE: NEGATIVE
C TRACH RRNA SPEC QL NAA+PROBE: NEGATIVE
N GONORRHOEA RRNA NPH QL NAA+PROBE: NEGATIVE
N GONORRHOEA RRNA SPEC QL NAA+PROBE: NEGATIVE

## 2024-03-24 LAB
C TRACH DNA ANORECTL QL NAA+PROBE: NEGATIVE
N GONORRHOEA RRNA ANORECTL QL NAA+PROBE: NEGATIVE

## 2024-03-27 LAB — ANDROSTANOLONE SERPL-MCNC: 7.6 NG/DL

## 2024-03-28 LAB — ESTRONE SERPL-MCNC: 82 PG/ML (ref 27–231)

## 2024-05-12 DIAGNOSIS — G44.019 EPISODIC CLUSTER HEADACHE, NOT INTRACTABLE: Primary | ICD-10-CM

## 2024-05-12 DIAGNOSIS — Z72.51 HIGH RISK SEXUAL BEHAVIOR, UNSPECIFIED TYPE: ICD-10-CM

## 2024-05-12 RX ORDER — RIZATRIPTAN BENZOATE 5 MG/1
5 TABLET, ORALLY DISINTEGRATING ORAL ONCE AS NEEDED
Qty: 12 TABLET | Refills: 1 | Status: SHIPPED | OUTPATIENT
Start: 2024-05-12

## 2024-05-13 RX ORDER — EMTRICITABINE AND TENOFOVIR DISOPROXIL FUMARATE 200; 300 MG/1; MG/1
1 TABLET, FILM COATED ORAL DAILY
Qty: 90 TABLET | Refills: 1 | Status: SHIPPED | OUTPATIENT
Start: 2024-05-13

## 2024-05-16 DIAGNOSIS — G44.019 EPISODIC CLUSTER HEADACHE, NOT INTRACTABLE: Primary | ICD-10-CM

## 2024-05-16 RX ORDER — VERAPAMIL HYDROCHLORIDE 80 MG/1
80 TABLET ORAL 3 TIMES DAILY
Qty: 90 TABLET | Refills: 0 | Status: SHIPPED | OUTPATIENT
Start: 2024-05-16

## 2024-05-16 RX ORDER — ZOLMITRIPTAN 5 MG/1
1 SPRAY NASAL AS NEEDED
Qty: 6 EACH | Refills: 1 | Status: SHIPPED | OUTPATIENT
Start: 2024-05-16

## 2024-05-17 DIAGNOSIS — G44.011 INTRACTABLE EPISODIC CLUSTER HEADACHE: Primary | ICD-10-CM

## 2024-05-17 RX ORDER — PREDNISONE 20 MG/1
TABLET ORAL
Qty: 85 TABLET | Refills: 0 | Status: SHIPPED | OUTPATIENT
Start: 2024-05-17 | End: 2024-06-19

## 2024-05-20 ENCOUNTER — OFFICE VISIT (OUTPATIENT)
Dept: FAMILY MEDICINE CLINIC | Facility: CLINIC | Age: 34
End: 2024-05-20
Payer: COMMERCIAL

## 2024-05-20 VITALS
RESPIRATION RATE: 18 BRPM | HEART RATE: 104 BPM | WEIGHT: 186 LBS | HEIGHT: 72 IN | DIASTOLIC BLOOD PRESSURE: 92 MMHG | SYSTOLIC BLOOD PRESSURE: 138 MMHG | OXYGEN SATURATION: 98 % | BODY MASS INDEX: 25.19 KG/M2

## 2024-05-20 DIAGNOSIS — H91.92 HEARING LOSS OF LEFT EAR, UNSPECIFIED HEARING LOSS TYPE: ICD-10-CM

## 2024-05-20 DIAGNOSIS — R51.9 ONE-SIDED HEADACHE: Primary | ICD-10-CM

## 2024-05-20 DIAGNOSIS — H93.12 TINNITUS OF LEFT EAR: ICD-10-CM

## 2024-05-20 PROCEDURE — 99214 OFFICE O/P EST MOD 30 MIN: CPT | Performed by: NURSE PRACTITIONER

## 2024-05-20 NOTE — PROGRESS NOTES
Subjective   Shankar Keene is a 33 y.o. adult.     History of Present Illness   Estab pt Dr Bryant.  Acute visit for headaches    Acute severe one-sided, stabbing, intermittent headache.  First occurred after running had severe headache behind Left eye 16 days ago. This went away eventually and 3 days later would wake her up in the morning/night x several days.  She denies history of migraines.  No nausea or vomiting.  She has been discussing over MyChart concerns with Dr. Bryant.  In past several days headache seems a bit better .  Dr. Bryant has discussed with patient and he feels this could be cluster headache.  Of note, she has seen ENT 4 years ago and had scope and panoramic xray and no chronic sinusistis history.  Has tried treated w rizatriptan without relief.  Just had eye exam last week. Normal eye exam and optho evaluated behind the eye.  3 days ago started verapamil 80mg and has only been taking once a day (although this was ordered 3 times daily). Dr. Bryant had previously written for a large prednisone taper.  She is wanting to avoid oral steroids at this time. she reports severe, sharp, stabbing pain lasts 20 min-1 hr. No light sensitivity, no nausea, no dizziness. She does not report teary eye, no temple tenderness. Eye feels swollen.  She also mentions history of hearing loss and tinnitus in the left ear when she was living in [place] in 2016.  Reports she had previous MRI in [place] that was normal.        The following portions of the patient's history were reviewed and updated as appropriate: allergies, current medications, past family history, past medical history, past social history, past surgical history and problem list.    Review of Systems      Current Outpatient Medications:     albuterol sulfate  (90 Base) MCG/ACT inhaler, Inhale 2 puffs Every 4 (Four) Hours As Needed for Wheezing., Disp: 8 g, Rfl: 6    amphetamine-dextroamphetamine (ADDERALL) 15 MG tablet, , Disp: ,  "Rfl:     busPIRone (BUSPAR) 5 MG tablet, As needed, Disp: , Rfl:     cetirizine (zyrTEC) 10 MG tablet, Take 1 tablet by mouth Daily., Disp: , Rfl:     doxycycline (VIBRAMYCIN) 100 MG capsule, Take 1 capsule by mouth 2 (Two) Times a Day., Disp: 30 capsule, Rfl: 0    emtricitabine-tenofovir (TRUVADA) 200-300 MG per tablet, TAKE 1 TABLET BY MOUTH EVERY DAY, Disp: 90 tablet, Rfl: 1    estradiol valerate (DELESTROGEN) 20 MG/ML injection, Inject 0.1 ml (2 mg) subcutaneously twice weekly., Disp: 5 mL, Rfl: 3    ketoconazole (NIZORAL) 2 % shampoo, LATHER ONTO THE SCALP AND LET SIT FOR 5 MINS BEFORE RINSING. THEN USE TWICE WEEKLY, Disp: , Rfl:     Needle, Disp, (CareTouch Hypodermic Needle) 27G X 1-1/2\" misc, Use 1 Units 2 (Two) Times a Week., Disp: 100 each, Rfl: 1    predniSONE (DELTASONE) 20 MG tablet, Take 5 tablets by mouth Daily for 3 days, THEN 4.5 tablets Daily for 3 days, THEN 4 tablets Daily for 3 days, THEN 3.5 tablets Daily for 3 days, THEN 3 tablets Daily for 3 days, THEN 2.5 tablets Daily for 3 days, THEN 2 tablets Daily for 3 days, THEN 1.5 tablets Daily for 3 days, THEN 1 tablet Daily for 4 days, THEN 0.5 tablets Daily for 5 days., Disp: 85 tablet, Rfl: 0    progesterone oil 50 MG/ML injection, Inject 0.2 mL into the appropriate muscle as directed by prescriber 1 (One) Time Per Week., Disp: 10 mL, Rfl: 1    sildenafil (REVATIO) 20 MG tablet, Take 1 tablet by mouth Daily As Needed (sexual activity). May take up to 3 tabs at a time., Disp: 90 tablet, Rfl: 2    Syringe, Disposable, (CareTouch Luer Lock) 1 ML misc, Use 1 Units 2 (Two) Times a Week., Disp: 100 each, Rfl: 1    Syringe/Needle, Disp, (B-D LUER-FARHANA SYRINGE) 20G X 1\" 1 ML misc, Use 1 Units 2 (Two) Times a Week., Disp: 100 each, Rfl: 1    testosterone (ANDROGEL) 25 MG/2.5GM (1%) gel gel, Place 25 mg on the skin as directed by provider Daily., Disp: 10 each, Rfl: 5    tretinoin microspheres (RETIN-A MICRO) 0.04 % gel, Apply  topically to the appropriate " area as directed Every Night., Disp: 50 g, Rfl: 1    verapamil (CALAN) 80 MG tablet, Take 1 tablet by mouth 3 (Three) Times a Day., Disp: 90 tablet, Rfl: 0    ZOLMitriptan (ZOMIG) 5 MG nasal solution, 1 spray into the nostril(s) as directed by provider As Needed for Migraine., Disp: 6 each, Rfl: 1    Objective   Physical Exam  Vitals reviewed.   Constitutional:       Appearance: Normal appearance.   HENT:      Right Ear: Tympanic membrane normal.      Left Ear: Tympanic membrane normal.      Mouth/Throat:      Mouth: Mucous membranes are moist.   Eyes:      General: No scleral icterus.        Right eye: No discharge.         Left eye: No discharge.      Extraocular Movements: Extraocular movements intact.      Conjunctiva/sclera: Conjunctivae normal.      Pupils: Pupils are equal, round, and reactive to light.   Cardiovascular:      Rate and Rhythm: Normal rate.      Pulses: Normal pulses.      Heart sounds: Normal heart sounds.   Pulmonary:      Breath sounds: Normal breath sounds.   Musculoskeletal:         General: Normal range of motion.      Cervical back: Normal range of motion.   Lymphadenopathy:      Cervical: No cervical adenopathy.   Skin:     General: Skin is warm.   Neurological:      General: No focal deficit present.      Mental Status: She is alert.   Psychiatric:         Mood and Affect: Mood is anxious.         Vitals:    05/20/24 1404   BP: 138/92   Pulse: 104   Resp: 18   SpO2: 98%     Body mass index is 25.23 kg/m².    Procedures    TSH   Date Value Ref Range Status   12/28/2018 0.708 0.27 - 4.20 u(iU)/mL Final     Hemoglobin A1C   Date Value Ref Range Status   04/13/2018 5.1 4.8 - 5.6 % Final     Comment:              Pre-diabetes: 5.7 - 6.4           Diabetes: >6.4           Glycemic control for adults with diabetes: <7.0                     Assessment & Plan   Problems Addressed this Visit    None  Visit Diagnoses       One-sided headache    -  Primary    Relevant Orders    C-reactive protein  (Completed)    Sedimentation Rate (Completed)    CBC & Differential (Completed)    Ambulatory Referral to Neurology    Comprehensive metabolic panel (Completed)    Ambulatory Referral to ENT (Otolaryngology) (Completed)    Magnesium    Hearing loss of left ear, unspecified hearing loss type        Relevant Orders    Magnesium    Tinnitus of left ear        Relevant Orders    Magnesium          Diagnoses         Codes Comments    One-sided headache    -  Primary ICD-10-CM: R51.9  ICD-9-CM: 784.0     Hearing loss of left ear, unspecified hearing loss type     ICD-10-CM: H91.92  ICD-9-CM: 389.9     Tinnitus of left ear     ICD-10-CM: H93.12  ICD-9-CM: 388.30           Orders Placed This Encounter   Procedures    C-reactive protein     Order Specific Question:   Release to patient     Answer:   Routine Release [[id number]]     Order Specific Question:   [facility] Has the patient fasted?     Answer:   No    Sedimentation Rate     Order Specific Question:   Release to patient     Answer:   Routine Release [[id number]]     Order Specific Question:   [facility] Has the patient fasted?     Answer:   No    Comprehensive metabolic panel     Order Specific Question:   Release to patient     Answer:   Routine Release [[id number]]     Order Specific Question:   [facility] Has the patient fasted?     Answer:   No    Magnesium     Order Specific Question:   Release to patient     Answer:   Routine Release [[id number]]    Magnesium     Order Specific Question:   [facility] Has the patient fasted?     Answer:   No    Ambulatory Referral to Neurology     Referral Priority:   Urgent     Referral Type:   Consultation     Referral Reason:   Specialty Services Required     Requested Specialty:   Neurology     Number of Visits Requested:   1    Ambulatory Referral to ENT (Otolaryngology)     Referral Priority:   Routine     Referral Type:   Consultation     Referral Reason:   Specialty Services Required     Requested Specialty:   Otolaryngology      Number of Visits Requested:   1    CBC & Differential     Order Specific Question:   Release to patient     Answer:   Routine Release [[id number]]     Order Specific Question:   [facility] Has the patient fasted?     Answer:   No     One-sided headache-reviewed with patient that she needs to take medications as ordered by Dr. Bryant, refer to neurology.  Check labs.  Advise ENT eval for tinnitus and hearing loss on left side.  She is concerned since one-sided headache is also on the left side.  She declines MRI ordered today.  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 Bryant how she would like to proceed.  Avoid headache triggers.  Keep headache journal.  Advised patient to stay hydrated, take additional magnesium daily, take verapamil as ordered 80 mg 3 times daily.  BP is stable 138/92 today.  ER for severe worsening         Education provided in AVS   No follow-ups on file.

## 2024-05-21 LAB
ALBUMIN SERPL-MCNC: 4.4 G/DL (ref 3.9–4.9)
ALBUMIN/GLOB SERPL: 2.2 {RATIO} (ref 1.2–2.2)
ALP SERPL-CCNC: 53 IU/L (ref 44–121)
ALT SERPL-CCNC: 13 IU/L (ref 0–32)
AST SERPL-CCNC: 15 IU/L (ref 0–40)
BASOPHILS # BLD AUTO: 0 X10E3/UL (ref 0–0.2)
BASOPHILS NFR BLD AUTO: 1 %
BILIRUB SERPL-MCNC: 0.4 MG/DL (ref 0–1.2)
BUN SERPL-MCNC: 12 MG/DL (ref 6–20)
BUN/CREAT SERPL: 14 (ref 9–23)
CALCIUM SERPL-MCNC: 9 MG/DL (ref 8.7–10.2)
CHLORIDE SERPL-SCNC: 105 MMOL/L (ref 96–106)
CO2 SERPL-SCNC: 23 MMOL/L (ref 20–29)
CREAT SERPL-MCNC: 0.85 MG/DL (ref 0.57–1)
CRP SERPL-MCNC: <1 MG/L (ref 0–10)
EGFRCR SERPLBLD CKD-EPI 2021: 92 ML/MIN/1.73
EOSINOPHIL # BLD AUTO: 0.1 X10E3/UL (ref 0–0.4)
EOSINOPHIL NFR BLD AUTO: 1 %
ERYTHROCYTE [DISTWIDTH] IN BLOOD BY AUTOMATED COUNT: 12.1 % (ref 11.7–15.4)
ERYTHROCYTE [SEDIMENTATION RATE] IN BLOOD BY WESTERGREN METHOD: 4 MM/HR (ref 0–32)
GLOBULIN SER CALC-MCNC: 2 G/DL (ref 1.5–4.5)
GLUCOSE SERPL-MCNC: 90 MG/DL (ref 70–99)
HCT VFR BLD AUTO: 38.5 % (ref 34–46.6)
HGB BLD-MCNC: 13 G/DL (ref 11.1–15.9)
IMM GRANULOCYTES # BLD AUTO: 0 X10E3/UL (ref 0–0.1)
IMM GRANULOCYTES NFR BLD AUTO: 0 %
LYMPHOCYTES # BLD AUTO: 2.2 X10E3/UL (ref 0.7–3.1)
LYMPHOCYTES NFR BLD AUTO: 26 %
MAGNESIUM SERPL-MCNC: 2 MG/DL (ref 1.6–2.3)
MCH RBC QN AUTO: 31.4 PG (ref 26.6–33)
MCHC RBC AUTO-ENTMCNC: 33.8 G/DL (ref 31.5–35.7)
MCV RBC AUTO: 93 FL (ref 79–97)
MONOCYTES # BLD AUTO: 0.6 X10E3/UL (ref 0.1–0.9)
MONOCYTES NFR BLD AUTO: 7 %
NEUTROPHILS # BLD AUTO: 5.6 X10E3/UL (ref 1.4–7)
NEUTROPHILS NFR BLD AUTO: 65 %
PLATELET # BLD AUTO: 187 X10E3/UL (ref 150–450)
POTASSIUM SERPL-SCNC: 4.2 MMOL/L (ref 3.5–5.2)
PROT SERPL-MCNC: 6.4 G/DL (ref 6–8.5)
RBC # BLD AUTO: 4.14 X10E6/UL (ref 3.77–5.28)
SODIUM SERPL-SCNC: 139 MMOL/L (ref 134–144)
WBC # BLD AUTO: 8.5 X10E3/UL (ref 3.4–10.8)

## 2024-06-12 DIAGNOSIS — G44.019 EPISODIC CLUSTER HEADACHE, NOT INTRACTABLE: ICD-10-CM

## 2024-06-12 RX ORDER — VERAPAMIL HYDROCHLORIDE 80 MG/1
80 TABLET ORAL 3 TIMES DAILY
Qty: 90 TABLET | Refills: 0 | Status: SHIPPED | OUTPATIENT
Start: 2024-06-12

## 2024-06-20 ENCOUNTER — OFFICE VISIT (OUTPATIENT)
Dept: NEUROLOGY | Facility: CLINIC | Age: 34
End: 2024-06-20
Payer: COMMERCIAL

## 2024-06-20 VITALS
SYSTOLIC BLOOD PRESSURE: 122 MMHG | DIASTOLIC BLOOD PRESSURE: 72 MMHG | HEART RATE: 105 BPM | WEIGHT: 181 LBS | BODY MASS INDEX: 24.52 KG/M2 | OXYGEN SATURATION: 99 % | HEIGHT: 72 IN

## 2024-06-20 DIAGNOSIS — R51.9 ACUTE NONINTRACTABLE HEADACHE, UNSPECIFIED HEADACHE TYPE: Primary | ICD-10-CM

## 2024-06-20 RX ORDER — TRIAMCINOLONE ACETONIDE 1 MG/G
1 CREAM TOPICAL
COMMUNITY
Start: 2024-06-19

## 2024-06-20 RX ORDER — TIZANIDINE 4 MG/1
4 TABLET ORAL NIGHTLY
Qty: 30 TABLET | Refills: 5 | Status: SHIPPED | OUTPATIENT
Start: 2024-06-20

## 2024-06-20 NOTE — PROGRESS NOTES
DOS: 2024  NAME: Shankar Keene   : 1990  PCP: Preston Bryant MD  Chief Complaint   Patient presents with    Headache     Right side        Referring MD: Catrina Shanks APRN    Neurological Problem and Interval History:  33 y.o. adult presenting for evaluation for headaches.  Patient was born a male and is undergoing transformation into a female.  Currently taking estrogen and progesterone.  States that she began having headaches about 5 weeks ago that started behind the left eye.  She went for a walk with her running group and when she returned home developed an intense pain behind the left eye.  Denies any redness or tearing from the eye.  States it felt like an ice pick type of pain.  Did not have any photophobia, phonophobia or nausea with the headache.  Over the next several weeks she continued to have fairly frequent headaches in this location.  About a week ago, she attempted to make a few changes to improve the headaches.  She thought maybe it was related to clenching her jaw at night and therefore started using a bite guard.  She also reduced her dose of progesterone as her primary care physician had increased this dose around the time that the headaches began as well.    The pain has woken her up during the night.    Has been treated with rizatriptan without relief.  Had an eye exam about a month ago that was normal.    The pain can last anywhere from 20 minutes to 1 hour.    She states that she had an MRI in [place] in 2017 after developing hearing loss in her left ear.  MRI brain was normal at that time.    She was prescribed verapamil  Past Medical/Surgical Hx:  Past Medical History:   Diagnosis Date    ADHD (attention deficit hyperactivity disorder)     Asthma     Atrial fibrillation     work up underway w/Cardiology    BRBPR (bright red blood per rectum)     normal colonoscopy 3/10/18    Depression     Diverticulosis     GERD (gastroesophageal reflux disease)     Gynecomastia,  "male     Low testosterone in male      Past Surgical History:   Procedure Laterality Date    COLONOSCOPY      WISDOM TOOTH EXTRACTION         Review of Systems:        Review of Systems   HENT:  Positive for tinnitus.    Eyes:  Negative for photophobia and visual disturbance.   Neurological:  Positive for headache. Negative for dizziness, tremors, seizures, syncope, facial asymmetry, speech difficulty, weakness, light-headedness, numbness, memory problem and confusion.      Medications    Current Outpatient Medications:     albuterol sulfate  (90 Base) MCG/ACT inhaler, Inhale 2 puffs Every 4 (Four) Hours As Needed for Wheezing., Disp: 8 g, Rfl: 6    amphetamine-dextroamphetamine (ADDERALL) 15 MG tablet, , Disp: , Rfl:     busPIRone (BUSPAR) 5 MG tablet, As needed, Disp: , Rfl:     cetirizine (zyrTEC) 10 MG tablet, Take 1 tablet by mouth Daily., Disp: , Rfl:     doxycycline (VIBRAMYCIN) 100 MG capsule, Take 1 capsule by mouth 2 (Two) Times a Day., Disp: 30 capsule, Rfl: 0    estradiol valerate (DELESTROGEN) 20 MG/ML injection, Inject 0.1 ml (2 mg) subcutaneously twice weekly., Disp: 5 mL, Rfl: 3    ketoconazole (NIZORAL) 2 % shampoo, LATHER ONTO THE SCALP AND LET SIT FOR 5 MINS BEFORE RINSING. THEN USE TWICE WEEKLY, Disp: , Rfl:     Needle, Disp, (CareTouch Hypodermic Needle) 27G X 1-1/2\" misc, Use 1 Units 2 (Two) Times a Week., Disp: 100 each, Rfl: 1    progesterone oil 50 MG/ML injection, Inject 0.2 mL into the appropriate muscle as directed by prescriber 1 (One) Time Per Week., Disp: 10 mL, Rfl: 1    sildenafil (REVATIO) 20 MG tablet, Take 1 tablet by mouth Daily As Needed (sexual activity). May take up to 3 tabs at a time., Disp: 90 tablet, Rfl: 2    Syringe, Disposable, (CareTouch Luer Lock) 1 ML misc, Use 1 Units 2 (Two) Times a Week., Disp: 100 each, Rfl: 1    Syringe/Needle, Disp, (B-D LUER-FARHANA SYRINGE) 20G X 1\" 1 ML misc, Use 1 Units 2 (Two) Times a Week., Disp: 100 each, Rfl: 1    testosterone " (ANDROGEL) 25 MG/2.5GM (1%) gel gel, Place 25 mg on the skin as directed by provider Daily., Disp: 10 each, Rfl: 5    tretinoin microspheres (RETIN-A MICRO) 0.04 % gel, Apply  topically to the appropriate area as directed Every Night., Disp: 50 g, Rfl: 1    verapamil (CALAN) 80 MG tablet, TAKE 1 TABLET BY MOUTH THREE TIMES DAILY, Disp: 90 tablet, Rfl: 0    emtricitabine-tenofovir (TRUVADA) 200-300 MG per tablet, TAKE 1 TABLET BY MOUTH EVERY DAY, Disp: 90 tablet, Rfl: 1    triamcinolone (KENALOG) 0.1 % cream, Apply 1 Application topically to the appropriate area as directed., Disp: , Rfl:     verapamil (CALAN) 80 MG tablet, TAKE 1 TABLET BY MOUTH THREE TIMES DAILY, Disp: 90 tablet, Rfl: 0    ZOLMitriptan (ZOMIG) 5 MG nasal solution, 1 spray into the nostril(s) as directed by provider As Needed for Migraine., Disp: 6 each, Rfl: 1     Allergies:  Allergies   Allergen Reactions    Red Dye Other (See Comments)    Penicillins Rash    Sulfa Antibiotics Rash       Social Hx:  Social History     Socioeconomic History    Marital status:    Tobacco Use    Smoking status: Never     Passive exposure: Never    Smokeless tobacco: Never   Vaping Use    Vaping status: Never Used   Substance and Sexual Activity    Alcohol use: Yes     Alcohol/week: 1.0 standard drink of alcohol     Types: 1 Glasses of wine per week    Drug use: Yes     Types: Marijuana     Comment: very rarely    Sexual activity: Yes     Partners: Female     Birth control/protection: None     Comment: , no kids       Family Hx:  Family History   Problem Relation Age of Onset    Alcohol abuse Mother     Crohn's disease Father     Alcohol abuse Father     Heart disease Father     Hypertension Father     Prostate cancer Father     No Known Problems Sister     Leukemia Paternal Grandmother 60    Heart attack Paternal Grandfather     Breast cancer Neg Hx     Colon cancer Neg Hx        I have reviewed and confirmed the accuracy of the patient's history: Chief  complaint, HPI, ROS, Subjective, and Past Family Social History as entered by the MA/CASANDRAN/RN. Isabel Orourke MA 06/20/24      Review of Imaging (Interpretation of scans not reports):      Laboratory Results:       Physical Examination:  General Appearance:  Well developed, well nourished, well groomed, alert, and cooperative.  HEENT: Normocephalic.    Neck and Spine: Normal range of motion.  Normal alignment. No mass or tenderness. No bruits.  Cardiac: Regular rate and rhythm. No murmurs.  Peripheral Vasculature: Radial and pedal pulses are equal and symmetric.   Extremities: No edema or deformities. Normal joint ROM.  Skin: No rashes or birth marks.      Neurological examination:   Higher Integrative Function: Oriented to time, place, and person. Normal registration, recall attention span and concentration. Normal language including comprehension, spontaneous speech, repetition, reading, writing, naming and vocabulary. No neglect with normal visual-spatial function and construction. Normal fund of knowledge and higher integrative function.   CN II: Pupils are equal, round and reactive to light. Normal visual acuity and visual fields.   CN III IV VI: Extraocular movements are full without nystagmus.   CN V: Normal facial sensation and strength of muscles of mastication.   CN VII: Facial movements are symmetric. No weakness.   CN VIII: Auditory acuity is normal.  CN IX & X: Symmetric palatal movement.   CN XI: Sternocleidomastoid and trapezius are normal. No weakness.   CN XII: The tongue is midline. No atrophy or fasciculations.  Motor: Normal muscle strength, bulk and tone in upper and lower extremities. No fasciculations, rigidity, spasticity, or abnormal movements.   Reflexes: 2+ in the upper and lower extremities. Plantar responses are flexor.   Sensation: Normal light touch, pinprick, vibration, temperature, and proprioception in the arms and legs.   Station and Gait: Normal gait and station.   Coordination:  Finger to nose test shows no dysmetria. Rapid alternating movements are normal. Heel to shin is normal.            Diagnoses / Discussion:      Plan:       Diagnoses and all orders for this visit:    1. Acute nonintractable headache, unspecified headache type (Primary)  -     MRI Brain With & Without Contrast; Future  -     TSH; Future  -     T4, free; Future    Other orders  -     tiZANidine (ZANAFLEX) 4 MG tablet; Take 1 tablet by mouth Every Night.  Dispense: 30 tablet; Refill: 5    While she does have some features of cluster headache, she has seen improvement in her headaches over the last week after lowering the dose of progesterone and starting to wear the bite guard.  Is difficult to tell if either of these changes have improved her headaches or not, however I recommended continuing with these changes.  I also prescribed tizanidine 4 mg for her to take nightly if this is related to jaw clenching. MRI brain ordered as well given recent onset of these headaches. If she experiences sudden onset again of the severe pain behind her right eye I did discuss with her that we could try oxygen therapy to treat for possible cluster headache to see if this improves her symptoms.  If this does improve her symptoms, it would be fairly diagnostic for cluster headache.  For now given that things have improved we will hold off and treat with the tizanidine nightly as needed along with a bite guard.  We will contact her once MRI results are available and she will follow-up in the office in 10 weeks.    Coding

## 2024-06-21 ENCOUNTER — PATIENT ROUNDING (BHMG ONLY) (OUTPATIENT)
Dept: NEUROLOGY | Facility: CLINIC | Age: 34
End: 2024-06-21
Payer: COMMERCIAL

## 2024-07-02 ENCOUNTER — TELEPHONE (OUTPATIENT)
Dept: NEUROLOGY | Facility: CLINIC | Age: 34
End: 2024-07-02
Payer: COMMERCIAL

## 2024-07-02 NOTE — TELEPHONE ENCOUNTER
LVM in regard to r/s appt due to provider being out of office. Sent Mail reminder and [employer] Message (if applicable) on new scheduled appointment date for August 20th at 3:00PM

## 2024-07-10 ENCOUNTER — HOSPITAL ENCOUNTER (OUTPATIENT)
Dept: MRI IMAGING | Facility: HOSPITAL | Age: 34
Discharge: HOME | End: 2024-07-10
Admitting: NURSE PRACTITIONER
Payer: COMMERCIAL

## 2024-07-10 DIAGNOSIS — R51.9 ACUTE NONINTRACTABLE HEADACHE, UNSPECIFIED HEADACHE TYPE: ICD-10-CM

## 2024-07-10 PROCEDURE — 0 GADOBENATE DIMEGLUMINE 529 MG/ML SOLUTION: Performed by: NURSE PRACTITIONER

## 2024-07-10 PROCEDURE — 70553 MRI BRAIN STEM W/O & W/DYE: CPT

## 2024-07-10 PROCEDURE — A9577 INJ MULTIHANCE: HCPCS | Performed by: NURSE PRACTITIONER

## 2024-07-10 RX ADMIN — GADOBENATE DIMEGLUMINE 20 ML: 529 INJECTION, SOLUTION INTRAVENOUS at 08:33

## 2024-07-17 DIAGNOSIS — F64.0 GENDER DYSPHORIA IN ADULT: ICD-10-CM

## 2024-07-17 DIAGNOSIS — E34.9 HORMONE IMBALANCE: ICD-10-CM

## 2024-07-17 RX ORDER — PROGESTERONE 50 MG/ML
10 INJECTION, SOLUTION INTRAMUSCULAR WEEKLY
Qty: 10 ML | Refills: 1 | Status: SHIPPED | OUTPATIENT
Start: 2024-07-17 | End: 2024-07-17 | Stop reason: SDUPTHER

## 2024-07-17 RX ORDER — PROGESTERONE 50 MG/ML
10 INJECTION, SOLUTION INTRAMUSCULAR WEEKLY
Qty: 10 ML | Refills: 1 | Status: SHIPPED | OUTPATIENT
Start: 2024-07-17

## 2024-08-16 DIAGNOSIS — Z72.51 HIGH RISK SEXUAL BEHAVIOR, UNSPECIFIED TYPE: ICD-10-CM

## 2024-08-16 RX ORDER — CEFDINIR 300 MG/1
300 CAPSULE ORAL 2 TIMES DAILY
Qty: 14 CAPSULE | Refills: 0 | Status: SHIPPED | OUTPATIENT
Start: 2024-08-16

## 2024-08-16 RX ORDER — DOXYCYCLINE HYCLATE 100 MG/1
100 CAPSULE ORAL 2 TIMES DAILY
Qty: 30 CAPSULE | Refills: 0 | Status: SHIPPED | OUTPATIENT
Start: 2024-08-16

## 2024-08-20 ENCOUNTER — OFFICE VISIT (OUTPATIENT)
Dept: NEUROLOGY | Facility: CLINIC | Age: 34
End: 2024-08-20
Payer: COMMERCIAL

## 2024-08-20 VITALS
DIASTOLIC BLOOD PRESSURE: 86 MMHG | OXYGEN SATURATION: 99 % | SYSTOLIC BLOOD PRESSURE: 124 MMHG | RESPIRATION RATE: 20 BRPM | HEIGHT: 72 IN | HEART RATE: 91 BPM | BODY MASS INDEX: 24.79 KG/M2 | WEIGHT: 183 LBS

## 2024-08-20 DIAGNOSIS — G44.019 EPISODIC CLUSTER HEADACHE, NOT INTRACTABLE: Primary | ICD-10-CM

## 2024-08-20 PROCEDURE — 99213 OFFICE O/P EST LOW 20 MIN: CPT | Performed by: NURSE PRACTITIONER

## 2024-08-20 NOTE — PROGRESS NOTES
Subjective   Shankar Kenee is a 34 y.o. adult presenting for follow-up for headaches.  I saw the patient for her initial evaluation on June 20, 2024.  She began having headaches about 5 weeks prior to that.  The headaches occurred behind the left eye only.  They came on fairly suddenly and were very intense.  She did deny any redness or tearing from the eye.  The pain was described like an ice pick type of pain.  She denied any photophobia, phonophobia, or nausea.  Over the next several weeks she continued to have fairly frequent headaches in the same location.  Prior to her visit with me she started wearing a bite guard at nighttime and also reduced her dose of progesterone.  The headaches seem to improve and have not occurred since that time.    She was treated with rizatriptan and zolmitriptan without relief.  She had an eye exam that was normal.    I ordered an MRI of her brain that was unremarkable.    The pain would last anywhere from 20 minutes to 1 hour.    She did report some tightness in her jaw and since the bite guard has helped I prescribed her tizanidine 4 mg nightly.  She used this some but has not continued it due to the headaches not recurring.    History of Present Illness     Review of Systems    I have reviewed and confirmed the accuracy of the patient's history: Chief complaint, HPI, ROS, Subjective, and Past Family Social History as entered by the MA/SERVANDO/RN. Elis Ramos MA 08/20/24      Objective     Physical Examination:  General Appearance:  Well developed, well nourished, well groomed, alert, and cooperative.  HEENT: Normocephalic.    Neck and Spine: Normal range of motion.  Normal alignment. No mass or tenderness. No bruits.  Cardiac: Regular rate and rhythm. No murmurs.  Peripheral Vasculature: Radial and pedal pulses are equal and symmetric.   Extremities: No edema or deformities. Normal joint ROM.  Skin: No rashes or birth marks.      Neurological examination:   Higher  Integrative Function: Oriented to time, place, and person. Normal registration, recall attention span and concentration. Normal language including comprehension, spontaneous speech, repetition, reading, writing, naming and vocabulary. No neglect with normal visual-spatial function and construction. Normal fund of knowledge and higher integrative function.   CN II: Pupils are equal, round and reactive to light. Normal visual acuity and visual fields.   CN III IV VI: Extraocular movements are full without nystagmus.   CN V: Normal facial sensation and strength of muscles of mastication.   CN VII: Facial movements are symmetric. No weakness.   CN VIII: Auditory acuity is normal.  CN IX & X: Symmetric palatal movement.   CN XI: Sternocleidomastoid and trapezius are normal. No weakness.   CN XII: The tongue is midline. No atrophy or fasciculations.  Motor: Normal muscle strength, bulk and tone in upper and lower extremities. No fasciculations, rigidity, spasticity, or abnormal movements.   Reflexes: 2+ in the upper and lower extremities. Plantar responses are flexor.   Sensation: Normal light touch, pinprick, vibration, temperature, and proprioception in the arms and legs.   Station and Gait: Normal gait and station.   Coordination: Finger to nose test shows no dysmetria. Rapid alternating movements are normal. Heel to shin is normal.           Assessment & Plan   Diagnoses and all orders for this visit:    1. Episodic cluster headache, not intractable (Primary)  -     Oxygen Therapy    It is possible that these are cluster headaches, although unlikely.  The patient is somewhat anxious that she will experience this intense headache again.  She states that it was quite severe and would wake her up from sleep.  I prescribed her oxygen to try in the event that this should occur again and is cluster headache.  This likely would help and would be somewhat diagnostic.  If this does occur and the oxygen is helpful she will call  and we can start her on a preventative medication for the cluster headaches such as verapamil or Emgality.  For now we will hold off.  She is to call with any problems, otherwise follow-up as needed.

## 2024-08-23 ENCOUNTER — TELEPHONE (OUTPATIENT)
Dept: FAMILY MEDICINE CLINIC | Facility: CLINIC | Age: 34
End: 2024-08-23
Payer: COMMERCIAL

## 2024-08-23 NOTE — TELEPHONE ENCOUNTER
Rica - can you help with this?      I was with a new partner tonight, he wore a condom but it broke during sex and possibly partially lost in there lol. I took on demand prep about an hour before (2 emtricitabine), do I need to take PEP as well now since I took it too late?

## 2024-08-23 NOTE — TELEPHONE ENCOUNTER
Spoke with patient and they stated that taking two of the doxycycline was not correct as PEP, as it will do nothing for a virus and is concerned about HIV as they had unprotected sex with a partner that they do not trust. Patient also stated that they have not been taking their PrEP correctly, which is also concerning them

## 2024-09-03 DIAGNOSIS — F51.02 ADJUSTMENT INSOMNIA: Primary | ICD-10-CM

## 2024-09-03 RX ORDER — ZOLPIDEM TARTRATE 5 MG/1
5 TABLET ORAL NIGHTLY PRN
Qty: 15 TABLET | Refills: 0 | Status: SHIPPED | OUTPATIENT
Start: 2024-09-03

## 2024-09-25 DIAGNOSIS — N52.8 OTHER MALE ERECTILE DYSFUNCTION: Primary | ICD-10-CM

## 2024-09-25 DIAGNOSIS — N48.89: ICD-10-CM

## 2024-09-25 DIAGNOSIS — F64.0 GENDER DYSPHORIA IN ADULT: ICD-10-CM

## 2024-10-05 DIAGNOSIS — E34.9 HORMONE IMBALANCE: ICD-10-CM

## 2024-10-05 DIAGNOSIS — F64.0 GENDER DYSPHORIA IN ADULT: ICD-10-CM

## 2024-10-07 RX ORDER — ESTRADIOL VALERATE 20 MG/ML
INJECTION INTRAMUSCULAR
Qty: 15 ML | Refills: 3 | Status: SHIPPED | OUTPATIENT
Start: 2024-10-07

## 2024-10-10 DIAGNOSIS — F64.0 GENDER DYSPHORIA IN ADULT: ICD-10-CM

## 2024-10-10 DIAGNOSIS — E34.9 HORMONE IMBALANCE: ICD-10-CM

## 2024-10-11 RX ORDER — ESTRADIOL VALERATE 20 MG/ML
INJECTION INTRAMUSCULAR
Qty: 5 ML | OUTPATIENT
Start: 2024-10-11

## 2024-10-18 ENCOUNTER — TELEMEDICINE (OUTPATIENT)
Dept: NEUROLOGY | Facility: CLINIC | Age: 34
End: 2024-10-18
Payer: COMMERCIAL

## 2024-10-18 DIAGNOSIS — G50.0 TRIGEMINAL NEURALGIA OF LEFT SIDE OF FACE: ICD-10-CM

## 2024-10-18 DIAGNOSIS — R51.9 LEFT FACIAL PAIN: ICD-10-CM

## 2024-10-18 DIAGNOSIS — Z79.899 MEDICATION MANAGEMENT: Primary | ICD-10-CM

## 2024-10-18 RX ORDER — OXCARBAZEPINE 150 MG/1
150 TABLET, FILM COATED ORAL 2 TIMES DAILY
Qty: 60 TABLET | Refills: 5 | Status: SHIPPED | OUTPATIENT
Start: 2024-10-18

## 2024-10-18 NOTE — PROGRESS NOTES
Subjective   Shankar Keene is a 34 y.o. adult presenting for follow up. He has been having a new type of pain to the left side of his face. This is exacerbated when he brushes his teeth. The pain is around his teeth and moves up into the cheek and base of left eye. He describes it as dull burning ache. Says he does clench his jaw at night. Has seen his dentist and had an xray of a few teeth that was normal. Says rest of dental exam was normal and they recommended he follow up with neurology.     I performed this clinical encounter by utilizing a real-time telehealth video connection between my location and the patient's location at home.  I obtained verbal consent from the patient to perform this clinical encounter utilizing video and prepared the patient by answering any questions they had about the telehealth interaction.    History of Present Illness     Review of Systems    I have reviewed and confirmed the accuracy of the patient's history: Chief complaint, HPI, ROS, Subjective, and Past Family Social History as entered by the MA/LPN/RN. Gallo Montalvo, APRN 10/18/24      Objective     Physical Examination:  General Appearance:  Well developed, well nourished, well groomed, alert, and cooperative.  HEENT: Normocephalic.        Neurological examination:   Higher Integrative Function: Oriented to time, place, and person. Normal registration, recall attention span and concentration. Normal language including comprehension, spontaneous speech, repetition, reading, writing, naming and vocabulary.      Assessment & Plan   Diagnoses and all orders for this visit:    1. Medication management (Primary)  -     Comprehensive Metabolic Panel    2. Left facial pain  -     CT facial bones wo contrast; Future    3. Trigeminal neuralgia of left side of face    Other orders  -     OXcarbazepine (TRILEPTAL) 150 MG tablet; Take 1 tablet by mouth 2 (Two) Times a Day.  Dispense: 60 tablet; Refill: 5     Possible trigeminal  neuralgia. Will check CT facial bones and start a trial of oxcarbazepine 150 twice a day. He will start with once daily for a few days. Order placed for CMP to check sodium level and will recheck after 4-6 weeks. Side effects discussed. He will call in about 4 weeks to let me know how he is doing and we will determine plan for follow up. I will contact him with CT results once available. Discussed possibility of TMJ and recommended discussing possibility of Botox injections with his dentist if no relief with oxcarbazepine.

## 2024-11-04 RX ORDER — NEEDLES, SAFETY 22GX1 1/2"
NEEDLE, DISPOSABLE MISCELLANEOUS
Qty: 26 EACH | Refills: 3 | Status: SHIPPED | OUTPATIENT
Start: 2024-11-04